# Patient Record
Sex: FEMALE | Race: WHITE | NOT HISPANIC OR LATINO
[De-identification: names, ages, dates, MRNs, and addresses within clinical notes are randomized per-mention and may not be internally consistent; named-entity substitution may affect disease eponyms.]

---

## 2021-05-14 PROBLEM — Z00.00 ENCOUNTER FOR PREVENTIVE HEALTH EXAMINATION: Status: ACTIVE | Noted: 2021-05-14

## 2021-05-18 ENCOUNTER — APPOINTMENT (OUTPATIENT)
Dept: SURGERY | Facility: CLINIC | Age: 81
End: 2021-05-18
Payer: MEDICARE

## 2021-05-18 VITALS
BODY MASS INDEX: 20.81 KG/M2 | DIASTOLIC BLOOD PRESSURE: 80 MMHG | TEMPERATURE: 97.4 F | SYSTOLIC BLOOD PRESSURE: 128 MMHG | WEIGHT: 116 LBS | HEIGHT: 62.5 IN

## 2021-05-18 PROCEDURE — 11603 EXC TR-EXT MAL+MARG 2.1-3 CM: CPT

## 2021-05-18 PROCEDURE — 99203 OFFICE O/P NEW LOW 30 MIN: CPT | Mod: 57

## 2021-05-18 NOTE — ASSESSMENT
[FreeTextEntry1] : Procedure: The area of the midback was prepped with betadine. 4.5 5cc of lidocaine with epi mixed with 0.5 bicarbonate was used to anesthetize the region. A # 15 blade was used to create an elliptical excision around the lesion. It was removed and sent to pathology for review. The wound was then closed with interrupted 4-0 nylon sutures. A sterile dressing was applied. The patient tolerated the procedure well.\par \par The specimen measured 1.7 cm by 2.5 cm and was sent to pathology for review.\par \par

## 2021-05-18 NOTE — PHYSICAL EXAM
[Normal Breath Sounds] : Normal breath sounds [Normal Heart Sounds] : normal heart sounds [Alert] : alert [Oriented to Person] : oriented to person [Oriented to Place] : oriented to place [Oriented to Time] : oriented to time [Calm] : calm [Abdominal Masses] : No abdominal masses [Abdomen Tenderness] : ~T ~M No abdominal tenderness [de-identified] : NAD [de-identified] : as per HPI

## 2021-05-18 NOTE — HISTORY OF PRESENT ILLNESS
[de-identified] : The patient is referred by Dr. 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 She has had previous skin lesions removed in the past. This was a new finding on her routine dermatologic exam. \par

## 2021-05-24 NOTE — ASSESSMENT
[FreeTextEntry1] : Wound clean and dry.Well healed. Sutures removed. Steri-stripped. No problems. SCC of midback margins all clear.Return visit PRN.\par

## 2021-05-25 ENCOUNTER — APPOINTMENT (OUTPATIENT)
Dept: SURGERY | Facility: CLINIC | Age: 81
End: 2021-05-25
Payer: MEDICARE

## 2021-05-25 PROCEDURE — 99024 POSTOP FOLLOW-UP VISIT: CPT

## 2022-09-27 ENCOUNTER — APPOINTMENT (OUTPATIENT)
Dept: SURGERY | Facility: CLINIC | Age: 82
End: 2022-09-27

## 2022-09-30 DIAGNOSIS — Z86.39 PERSONAL HISTORY OF OTHER ENDOCRINE, NUTRITIONAL AND METABOLIC DISEASE: ICD-10-CM

## 2022-09-30 DIAGNOSIS — Z86.79 PERSONAL HISTORY OF OTHER DISEASES OF THE CIRCULATORY SYSTEM: ICD-10-CM

## 2022-09-30 DIAGNOSIS — Z86.73 PERSONAL HISTORY OF TRANSIENT ISCHEMIC ATTACK (TIA), AND CEREBRAL INFARCTION W/OUT RESIDUAL DEFICITS: ICD-10-CM

## 2022-10-04 ENCOUNTER — APPOINTMENT (OUTPATIENT)
Dept: SURGERY | Facility: CLINIC | Age: 82
End: 2022-10-04

## 2022-10-04 VITALS
HEART RATE: 88 BPM | TEMPERATURE: 97 F | WEIGHT: 115 LBS | BODY MASS INDEX: 21.16 KG/M2 | DIASTOLIC BLOOD PRESSURE: 78 MMHG | HEIGHT: 62 IN | SYSTOLIC BLOOD PRESSURE: 148 MMHG

## 2022-10-04 DIAGNOSIS — C44.92 SQUAMOUS CELL CARCINOMA OF SKIN, UNSPECIFIED: ICD-10-CM

## 2022-10-04 PROCEDURE — 99213 OFFICE O/P EST LOW 20 MIN: CPT | Mod: 25

## 2022-10-04 PROCEDURE — 11603 EXC TR-EXT MAL+MARG 2.1-3 CM: CPT

## 2022-10-04 NOTE — PHYSICAL EXAM
[Normal Breath Sounds] : Normal breath sounds [Normal Heart Sounds] : normal heart sounds [Alert] : alert [Oriented to Person] : oriented to person [Oriented to Place] : oriented to place [Oriented to Time] : oriented to time [Calm] : calm [de-identified] : NAD [de-identified] : healing bx site

## 2022-10-04 NOTE — HISTORY OF PRESENT ILLNESS
[de-identified] : The patient is referred by Dr. 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

## 2022-10-04 NOTE — ASSESSMENT
[FreeTextEntry1] : excision recommended , pt agrees\par \par \par Procedure: The area was prepped with betadine. 4.5 5cc of lidocaine with epi mixed with 0.5 bicarbonate was used to anesthetize the region. A # 15 blade was used to create an elliptical excision around the lesion. It was removed and sent to pathology for review. The wound was then closed with interrupted mattress 3-0 nylon sutures. A sterile dressing was applied. The patient tolerated the procedure well.\par \par The specimen measured 2.1 cm by 1.8 cm and was sent to pathology for review.\par \par

## 2022-10-10 PROBLEM — Z48.02 VISIT FOR SUTURE REMOVAL: Status: ACTIVE | Noted: 2021-05-24

## 2022-10-11 ENCOUNTER — APPOINTMENT (OUTPATIENT)
Dept: SURGERY | Facility: CLINIC | Age: 82
End: 2022-10-11

## 2022-10-11 DIAGNOSIS — Z48.02 ENCOUNTER FOR REMOVAL OF SUTURES: ICD-10-CM

## 2022-10-11 PROCEDURE — 99024 POSTOP FOLLOW-UP VISIT: CPT

## 2022-10-11 NOTE — ASSESSMENT
[FreeTextEntry1] : Wound clean and dry.Well healed. Sutures removed. Steri-stripped. No problems. Return visit PRN.Margins clear\par